# Patient Record
(demographics unavailable — no encounter records)

---

## 2024-10-30 NOTE — HISTORY OF PRESENT ILLNESS
[Right] : right hand dominant [FreeTextEntry1] : Patient presenting for a new issue of right lateral epicondylitis for the past year.  She denies any specific injury but reports feeling the lateral pain with gripping or when playing tennis.  Patient had injections to the bilateral third digits for trigger finger on September 30, 2024 with relief of symptoms.

## 2024-10-30 NOTE — ASSESSMENT
[FreeTextEntry1] : Right elbow lateral epicondylitis  My impression is that this patient has right lateral epicondylitis.   The risks, benefits, differential diagnosis and alternatives were discussed with the patient in detail and they elected to undergo a steroid injection combined with a home program and therapy. The risks discussed included (but was not limited to) pain, infection, subcutaneous atrophy, skin depigmentation, etc... Under informed consent and sterile conditions, 1 cc of 2% plain lidocaine  and 1 cc of Kenalog 10 was precisely injected into the patient's lateral elbow at the ECRB origin at the area of maximal tenderness. A sterile Band-Aid was placed.  It is my hope that this significantly alleviates the patient's symptoms.   It is hoped that this results in a complete resolution of symptoms but his symptoms were not fully resolve in the next 4-6 weeks they will return to the office for reevaluation and further treatment.

## 2024-10-30 NOTE — PHYSICAL EXAM
[de-identified] : Physical exam shows the patient to be alert and oriented x3, capable of ambulation. The patient is well-developed and well-nourished in no apparent respiratory distress. Majority of the skin is intact bilaterally in the upper extremities without lymphadenopathy at the elbows.  The elbows are supple with full range of motion without evidence of effusion or instability of the medial lateral collateral ligament complex bilaterally. There is no pain upon forced flexion or extension of the elbows bilaterally. The right lateral epicondyle is tender to palpation which is accentuated with forced . There is no pain with forced flexion or extension of the elbows bilaterally The biceps and triceps is intact bilaterally with 5 over 5 strength bilaterally. There is no sensitivity over the median, radial or ulnar nerves with provocative maneuvers at the elbows bilaterally. No evidence of intrinsic or extrinsic atrophy bilaterally.    strength 40 pounds bilaterally Right hand dominant  There is good capillary refill of the digits bilaterally.There is no masses or sensitivity over the median and ulnar nerves at the level of the wrist. There is a negative Tinel's and negative Phalen's sign bilaterally. The sensation is grossly intact bilaterally. [de-identified] : PA and lateral of the right elbow shows no evidence of soft tissue calcifications with joint spaces well-preserved.

## 2024-10-30 NOTE — PHYSICAL EXAM
[de-identified] : Physical exam shows the patient to be alert and oriented x3, capable of ambulation. The patient is well-developed and well-nourished in no apparent respiratory distress. Majority of the skin is intact bilaterally in the upper extremities without lymphadenopathy at the elbows.  The elbows are supple with full range of motion without evidence of effusion or instability of the medial lateral collateral ligament complex bilaterally. There is no pain upon forced flexion or extension of the elbows bilaterally. The right lateral epicondyle is tender to palpation which is accentuated with forced . There is no pain with forced flexion or extension of the elbows bilaterally The biceps and triceps is intact bilaterally with 5 over 5 strength bilaterally. There is no sensitivity over the median, radial or ulnar nerves with provocative maneuvers at the elbows bilaterally. No evidence of intrinsic or extrinsic atrophy bilaterally.    strength 40 pounds bilaterally Right hand dominant  There is good capillary refill of the digits bilaterally.There is no masses or sensitivity over the median and ulnar nerves at the level of the wrist. There is a negative Tinel's and negative Phalen's sign bilaterally. The sensation is grossly intact bilaterally. [de-identified] : PA and lateral of the right elbow shows no evidence of soft tissue calcifications with joint spaces well-preserved.

## 2025-06-07 NOTE — REASON FOR VISIT
EXAMINATION TYPE: MR brain wo con

 

DATE OF EXAM: 8/11/2018

 

COMPARISON: 8/9/2018 CT brain

 

HISTORY: Patient with anoxic encephalopathy and confusion

 

CONTRAST:  Performed utilizing 0 mL intravenous Gadavist gadolinium contrast.  

 

TECHNIQUE: Multiplanar, multiecho imaging on a 3.0 Qi magnet is performed through the brain.  Stud
y is not performed within 24 hours of arrival to the hospital.

 

The craniovertebral junction is normal.  The pituitary is normal.  

 

Diffusion-weighted imaging is performed.  No abnormal hyperintensity is present to suggest an acute i
ntracranial infarct or acute ischemic change.

 

No significant signal changes evident suggest ischemic changes. No suspicious focal changes within th
e basal ganglion are evident. 

 

Ventricles and sulci are appropriate for the patient age.

 

 

IMPRESSIONS:

1. No suspicious changes for acute anoxic injury. [Other: ____] : [unfilled]

## 2025-06-08 NOTE — PHYSICAL EXAM
[Well Developed] : well developed [Well Nourished] : well nourished [No Acute Distress] : no acute distress [No Carotid Bruit] : no carotid bruit [Normal S1, S2] : normal S1, S2 [No Murmur] : no murmur [Clear Lung Fields] : clear lung fields [Good Air Entry] : good air entry [Soft] : abdomen soft [No Edema] : no edema [Normal Radial B/L] : normal radial B/L [Normal PT B/L] : normal PT B/L [Normal DP B/L] : normal DP B/L [Moves all extremities] : moves all extremities [No Focal Deficits] : no focal deficits [Alert and Oriented] : alert and oriented [Normal memory] : normal memory [de-identified] : Hearing normal right, reduced on the left TMs normal [de-identified] :  Rectal exam refused  [de-identified] : Breast exam refused

## 2025-06-08 NOTE — PHYSICAL EXAM
[Well Developed] : well developed [Well Nourished] : well nourished [No Acute Distress] : no acute distress [No Carotid Bruit] : no carotid bruit [Normal S1, S2] : normal S1, S2 [No Murmur] : no murmur [Clear Lung Fields] : clear lung fields [Good Air Entry] : good air entry [Soft] : abdomen soft [No Edema] : no edema [Normal Radial B/L] : normal radial B/L [Normal PT B/L] : normal PT B/L [Normal DP B/L] : normal DP B/L [Moves all extremities] : moves all extremities [No Focal Deficits] : no focal deficits [Alert and Oriented] : alert and oriented [Normal memory] : normal memory [de-identified] : Hearing normal right, reduced on the left TMs normal [de-identified] :  Rectal exam refused  [de-identified] : Breast exam refused

## 2025-06-08 NOTE — HISTORY OF PRESENT ILLNESS
[FreeTextEntry1] : fast walk 2.5 miles. diet good. eye long ago. pap 4 mos ago, kyler for tues.  brother colon ca 63yo. covid vaccine yrs.

## 2025-06-08 NOTE — PHYSICAL EXAM
[Well Developed] : well developed [Well Nourished] : well nourished [No Acute Distress] : no acute distress [No Carotid Bruit] : no carotid bruit [Normal S1, S2] : normal S1, S2 [No Murmur] : no murmur [Clear Lung Fields] : clear lung fields [Good Air Entry] : good air entry [Soft] : abdomen soft [No Edema] : no edema [Normal Radial B/L] : normal radial B/L [Normal PT B/L] : normal PT B/L [Normal DP B/L] : normal DP B/L [Moves all extremities] : moves all extremities [No Focal Deficits] : no focal deficits [Alert and Oriented] : alert and oriented [Normal memory] : normal memory [de-identified] : Hearing normal right, reduced on the left TMs normal [de-identified] :  Rectal exam refused  [de-identified] : Breast exam refused

## 2025-06-08 NOTE — HISTORY OF PRESENT ILLNESS
[FreeTextEntry1] : fast walk 2.5 miles. diet good. eye long ago. pap 4 mos ago, kyler for tues.  brother colon ca 61yo. covid vaccine yrs.

## 2025-06-08 NOTE — DISCUSSION/SUMMARY
[EKG obtained to assist in diagnosis and management of assessed problem(s)] : EKG obtained to assist in diagnosis and management of assessed problem(s) [FreeTextEntry1] : Patient was recommended to get an eye exam and discuss colonoscopy with the gastroenterologist.  She was recommended the COVID-vaccine.  Her EKG shows normal sinus rhythm and low voltage.  She will return for an echocardiogram to evaluate her for structural heart disease.  The patient will contact me what the names of her other providers. She is not having any cognitive impairment. She has no significant current or past depression. She is fully functional and there are no safety issues. She will be screened for eye examination, colonoscopy,  Pap smear, Bone density, Mammogram and Immunizations over the next 10 years. The patient was furnished with personalized health advice and does not need referral to health education or preventative counseling services. Patient does not need advanced care planning services.  Laboratory testing was done.

## 2025-07-22 NOTE — PHYSICAL EXAM
[Supple] : supple [No Supraclavicular Adenopathy] : no supraclavicular adenopathy [Examined in the supine and seated position] : examined in the supine and seated position [No dominant masses] : no dominant masses in right breast  [No dominant masses] : no dominant masses left breast [No Nipple Retraction] : no left nipple retraction [No Nipple Discharge] : no left nipple discharge [No Axillary Lymphadenopathy] : no left axillary lymphadenopathy [Normocephalic] : normocephalic [Atraumatic] : atraumatic [EOMI] : extra ocular movement intact [PERRL] : pupils equal, round and reactive to light [Sclera nonicteric] : sclera nonicteric [Clear to Auscultation Bilat] : clear to auscultation bilaterally [Normal Sinus Rhythm] : normal sinus rhythm [Normal S1, S2] : normal S1 and S2 [Soft] : abdomen soft [Not Tender] : non-tender [No Palpable Masses] : no abdominal mass palpated [Full ROM] : full range of motion [No Rashes] : no rashes [No Ulceration] : no ulceration

## 2025-07-24 NOTE — HISTORY OF PRESENT ILLNESS
[FreeTextEntry1] : 62 year old female who presents for initial evaluation regarding RIGHT DCIS, ER+ [place]-, initially seen on screening imaging 6/9/25 as 2 groups of indeterminate calcifications in the anterior right lower inner quadrant, BI-RADS 4B, with rec for stereo bx of both groups -- completed 6/19/25; path yielded DCIS, ER+100%, [place]-, intermediate to high grade, concordant and malignant. Second group of adjacent calcifications were unable to be visualized at time of biopsy, noted that these can be included in incision as they are located within 1cm of clip.  She completed a B/L MRI (7/11/25), BI-RADS 0, with recommendation for R US for further evaluation of a 1.2cm enhancing mass in the posterior lower outer right breast. The known R DCIS was also seen on the extent of disease MRI as a NME spanning 1.9cm and notes faint enhancement in the overlying medial skin surface, which may be secondary to post biopsy change; recommending clinical correlation.  Patient denies palpable masses, nipple discharge, skin changes. Denies prior breast surgeries. Reports previous benign L breast bx in 2002.  Patient reports family hx of breast cancer in her [place]unt at age 75. Denies famhx of ovarian cancer. Patient does not have any children. Patient completed genetic testing with Dr. Newman, negative for pathogenic mutations.   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  Patient denies history of heart disease, HTN, DM, blood clots, sleep apnea, COPD, issues with anesthesia. Patient denies any known drug allergies.

## 2025-07-24 NOTE — ASSESSMENT
[FreeTextEntry1] : 62 year old female presents for initial evaluation regarding RIGHT DCIS, ER+/AR-, initially seen on screening imaging 6/9/25 as 2 groups of indeterminate calcifications in the anterior right lower inner quadrant, BI-RADS 4B, with rec for stereo bx of both groups -- completed 6/19/25; path yielded DCIS, ER+100%, [place]-, intermediate to high grade, concordant and malignant. Second group of adjacent calcifications were unable to be visualized at time of biopsy, noted that these can be included in incision as they are located within 1cm of clip.   Films reviewed in depth. Discussed patient's diagnosis in detail and answered any questions. Reviewed DCIS as stage 0 breast cancer, amenable to excision by lumpectomy to negative margins, frequently accompanied by radiation therapy. Patient made aware that treatment plan will ultimately depend upon final surgical path results. Briefly reviewed radiation treatment and side effects. Reviewed DCISionRT test, done to assess for patient's recurrence risk and guide treatment planning. Also briefly discussed anti-estrogen therapy that patient will be recommended for following surgery, given her strongly ER positive tumor. Surgical technique was reviewed in depth, post-operative recovery and care discussed, and all patient questions were answered. Patient met with nurse navigator, María, in the office today.  Discussed patient's diagnosis in detail and answered any questions. Patient will R US to further assess an additional enhancement noted on MRI. Targeted R US and possible biopsy is scheduled at [facility] on 7/23 and 7/24 respectively. Pending results, plan for R localized lumpectomy. Patient cleared in-office today; she is medically optimized for breast surgery. Surgical consent obtained today; surgical coordinator aware. All patients questions were answered to their satisfaction. Patient verbalizes understanding and agreement with the plan.

## 2025-07-24 NOTE — PLAN
[TextEntry] : -Targeted R US and possible biopsy, scheduled at [facility] on 7/23 and 7/24 respectively -PCP clearance -R localized lumpectomy

## 2025-07-24 NOTE — ASSESSMENT
[FreeTextEntry1] : 62 year old female presents for initial evaluation regarding RIGHT DCIS, ER+/AR-, initially seen on screening imaging 6/9/25 as 2 groups of indeterminate calcifications in the anterior right lower inner quadrant, BI-RADS 4B, with rec for stereo bx of both groups -- completed 6/19/25; path yielded DCIS, ER+100%, [place]-, intermediate to high grade, concordant and malignant. Second group of adjacent calcifications were unable to be visualized at time of biopsy, noted that these can be included in incision as they are located within 1cm of clip.   Films reviewed in depth. Discussed patient's diagnosis in detail and answered any questions. Reviewed DCIS as stage 0 breast cancer, amenable to excision by lumpectomy to negative margins, frequently accompanied by radiation therapy. Patient made aware that treatment plan will ultimately depend upon final surgical path results. Briefly reviewed radiation treatment and side effects. Reviewed DCISionRT test, done to assess for patient's recurrence risk and guide treatment planning. Also briefly discussed anti-estrogen therapy that patient will be recommended for following surgery, given her strongly ER positive tumor. Surgical technique was reviewed in depth, post-operative recovery and care discussed, and all patient questions were answered. Patient met with nurse navigator, María, in the office today.  Discussed patient's diagnosis in detail and answered any questions. Patient will R US to further assess an additional enhancement noted on MRI. Targeted R US and possible biopsy is scheduled at [facility] on 7/23 and 7/24 respectively. Pending results, plan for R localized lumpectomy. Patient cleared in-office today; she is medically optimized for breast surgery. Surgical consent obtained today; surgical coordinator aware. All patients questions were answered to their satisfaction. Patient verbalizes understanding and agreement with the plan.

## 2025-07-24 NOTE — DATA REVIEWED
[FreeTextEntry1] : 6/9/25 (Natasha) B/l sMMG/US: heterogeneously dense. 2 groups of indeterminate calcifications in the anterior right lower inner quadrant. BI-RADS 4B. F/u: stereo bx   6/19/25 (Natasha) Stereo biopsy x 2 sites SITE 1: R lower inner calcs 3cmfn (hourglass clip): path yielded DCIS, ER+100%, [place]-, intermediate to high grade, solid and cribriform types, a/w necrosis and calcs. Concordant and malignant. Note, second group of adjacent calcifications were unable to be visualized at time of biopsy, these can be included in incision as they are located within 1cm of clip.  SITE 2: R lower inner breast calcs unable to be visualized, procedure cancelled.  7/11/25 ([facility]) B/L MRI: heterogeneously dense. BI-RADS 0, incomplete. F/U: R US 1.  In the anterior, lower inner right breast, there is non-mass enhancement spanning 1.9 cm, corresponding to site of prior stereotactic biopsy, which yielded ductal carcinoma in situ. The area of enhancement is similar to the extent of microcalcifications seen on mammogram. There is faint enhancement in the overlying medial skin surface, which may be secondary to post biopsy change; clinical correlation is advised. There is no lateral dermal involvement or nipple invasion.   2.  In the posterior, lower outer right breast, there is a 1.2 cm enhancing mass, for which targeted ultrasound examination is recommended. 3.  No suspicious enhancement in the left breast.

## 2025-07-24 NOTE — HISTORY OF PRESENT ILLNESS
[FreeTextEntry1] : 62 year old female who presents for initial evaluation regarding RIGHT DCIS, ER+ IN-, initially seen on screening imaging 6/9/25 as 2 groups of indeterminate calcifications in the anterior right lower inner quadrant, BI-RADS 4B, with rec for stereo bx of both groups -- completed 6/19/25; path yielded DCIS, ER+100%, IN-, intermediate to high grade, concordant and malignant. Second group of adjacent calcifications were unable to be visualized at time of biopsy, noted that these can be included in incision as they are located within 1cm of clip.  She completed a B/L MRI (7/11/25), BI-RADS 0, with recommendation for R US for further evaluation of a 1.2cm enhancing mass in the posterior lower outer right breast. The known R DCIS was also seen on the extent of disease MRI as a NME spanning 1.9cm and notes faint enhancement in the overlying medial skin surface, which may be secondary to post biopsy change; recommending clinical correlation.  Patient denies palpable masses, nipple discharge, skin changes. Denies prior breast surgeries. Reports previous benign L breast bx in 2002.  Patient reports family hx of breast cancer in her [place]unt at age 75. Denies famhx of ovarian cancer. Patient does not have any children. Patient completed genetic testing with Dr. Newman, negative for pathogenic mutations.   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  Patient denies history of heart disease, HTN, DM, blood clots, sleep apnea, COPD, issues with anesthesia. Patient denies any known drug allergies.

## 2025-07-24 NOTE — DATA REVIEWED
[FreeTextEntry1] : 6/9/25 (Natasha) B/l sMMG/US: heterogeneously dense. 2 groups of indeterminate calcifications in the anterior right lower inner quadrant. BI-RADS 4B. F/u: stereo bx   6/19/25 (Natasha) Stereo biopsy x 2 sites SITE 1: R lower inner calcs 3cmfn (hourglass clip): path yielded DCIS, ER+100%, [place]-, intermediate to high grade, solid and cribriform types, a/w necrosis and calcs. Concordant and malignant. Note, second group of adjacent calcifications were unable to be visualized at time of biopsy, these can be included in incision as they are located within 1cm of clip.  SITE 2: R lower inner breast calcs unable to be visualized, procedure cancelled.  7/11/25 ([facility]) B/L MRI: heterogeneously dense. BI-RADS 0, incomplete. F/U: R US 1.  In the anterior, lower inner right breast, there is non-mass enhancement spanning 1.9 cm, corresponding to site of prior stereotactic biopsy, which yielded ductal carcinoma in situ. The area of enhancement is similar to the extent of microcalcifications seen on mammogram. There is faint enhancement in the overlying medial skin surface, which may be secondary to post biopsy change; clinical correlation is advised. There is no lateral dermal involvement or nipple invasion.   2.  In the posterior, lower outer right breast, there is a 1.2 cm enhancing mass, for which targeted ultrasound examination is recommended. 3.  No suspicious enhancement in the left breast.

## 2025-07-24 NOTE — PLAN
[TextEntry] : -Targeted R US and possible biopsy, scheduled at [facility] on 7/23 and 7/24 respectively -PCP clearance -R localized lumpectomy

## 2025-07-24 NOTE — PLAN
[TextEntry] : -Targeted R US and possible biopsy, scheduled at [facility] on 7/23 and 7/24 respectively -PCP clearance -R localized lumpectomy

## 2025-07-24 NOTE — DATA REVIEWED
[FreeTextEntry1] : 6/9/25 (Natasha) B/l sMMG/US: heterogeneously dense. 2 groups of indeterminate calcifications in the anterior right lower inner quadrant. BI-RADS 4B. F/u: stereo bx   6/19/25 (Natasha) Stereo biopsy x 2 sites SITE 1: R lower inner calcs 3cmfn (hourglass clip): path yielded DCIS, ER+100%, [place]-, intermediate to high grade, solid and cribriform types, a/w necrosis and calcs. Concordant and malignant. Note, second group of adjacent calcifications were unable to be visualized at time of biopsy, these can be included in incision as they are located within 1cm of clip.  SITE 2: R lower inner breast calcs unable to be visualized, procedure cancelled.  7/11/25 ([facility]) B/L MRI: heterogeneously dense. BI-RADS 0, incomplete. F/U: R US 1.  In the anterior, lower inner right breast, there is non-mass enhancement spanning 1.9 cm, corresponding to site of prior stereotactic biopsy, which yielded ductal carcinoma in situ. The area of enhancement is similar to the extent of microcalcifications seen on mammogram. There is faint enhancement in the overlying medial skin surface, which may be secondary to post biopsy change; clinical correlation is advised. There is no lateral dermal involvement or nipple invasion.   2.  In the posterior, lower outer right breast, there is a 1.2 cm enhancing mass, for which targeted ultrasound examination is recommended. 3.  No suspicious enhancement in the left breast.

## 2025-07-24 NOTE — ASSESSMENT
[FreeTextEntry1] : 62 year old female presents for initial evaluation regarding RIGHT DCIS, ER+/[place]-, initially seen on screening imaging 6/9/25 as 2 groups of indeterminate calcifications in the anterior right lower inner quadrant, BI-RADS 4B, with rec for stereo bx of both groups -- completed 6/19/25; path yielded DCIS, ER+100%, [place]-, intermediate to high grade, concordant and malignant. Second group of adjacent calcifications were unable to be visualized at time of biopsy, noted that these can be included in incision as they are located within 1cm of clip.   Films reviewed in depth. Discussed patient's diagnosis in detail and answered any questions. Reviewed DCIS as stage 0 breast cancer, amenable to excision by lumpectomy to negative margins, frequently accompanied by radiation therapy. Patient made aware that treatment plan will ultimately depend upon final surgical path results. Briefly reviewed radiation treatment and side effects. Reviewed DCISionRT test, done to assess for patient's recurrence risk and guide treatment planning. Also briefly discussed anti-estrogen therapy that patient will be recommended for following surgery, given her strongly ER positive tumor. Surgical technique was reviewed in depth, post-operative recovery and care discussed, and all patient questions were answered. Patient met with nurse navigator, María, in the office today.  Discussed patient's diagnosis in detail and answered any questions. Patient will R US to further assess an additional enhancement noted on MRI. Targeted R US and possible biopsy is scheduled at [facility] on 7/23 and 7/24 respectively. Pending results, plan for R localized lumpectomy. Patient cleared in-office today; she is medically optimized for breast surgery. Surgical consent obtained today; surgical coordinator aware. All patients questions were answered to their satisfaction. Patient verbalizes understanding and agreement with the plan.

## 2025-07-24 NOTE — CONSULT LETTER
[Dear  ___] : Dear ~BROOKLYNN, [Consult Letter:] : I had the pleasure of evaluating your patient, [unfilled]. [Please see my note below.] : Please see my note below. [Consult Closing:] : Thank you very much for allowing me to participate in the care of this patient.  If you have any questions, please do not hesitate to contact me. [Sincerely,] : Sincerely, [FreeTextEntry2] : Dr. Kristal Newman [FreeTextEntry3] : Silverio Chong MD Chief of Breast Surgery Director of Breast Cancer Program [facility]

## 2025-07-24 NOTE — CONSULT LETTER
[Dear  ___] : Dear ~BROOKLYNN, [Consult Letter:] : I had the pleasure of evaluating your patient, [unfilled]. [Please see my note below.] : Please see my note below. [Consult Closing:] : Thank you very much for allowing me to participate in the care of this patient.  If you have any questions, please do not hesitate to contact me. [Sincerely,] : Sincerely, [FreeTextEntry2] : Dr. Kristal Newman [FreeTextEntry3] : Silverio Chong MD Chief of Breast Surgery Director of Breast Cancer Program [facility]

## 2025-07-24 NOTE — HISTORY OF PRESENT ILLNESS
[FreeTextEntry1] : 62 year old female who presents for initial evaluation regarding RIGHT DCIS, ER+ [place]-, initially seen on screening imaging 6/9/25 as 2 groups of indeterminate calcifications in the anterior right lower inner quadrant, BI-RADS 4B, with rec for stereo bx of both groups -- completed 6/19/25; path yielded DCIS, ER+100%, [place]-, intermediate to high grade, concordant and malignant. Second group of adjacent calcifications were unable to be visualized at time of biopsy, noted that these can be included in incision as they are located within 1cm of clip.  She completed a B/L MRI (7/11/25), BI-RADS 0, with recommendation for R US for further evaluation of a 1.2cm enhancing mass in the posterior lower outer right breast. The known R DCIS was also seen on the extent of disease MRI as a NME spanning 1.9cm and notes faint enhancement in the overlying medial skin surface, which may be secondary to post biopsy change; recommending clinical correlation.  Patient denies palpable masses, nipple discharge, skin changes. Denies prior breast surgeries. Reports previous benign L breast bx in 2002.  Patient reports family hx of breast cancer in her PAunt at age 75. Denies famhx of ovarian cancer. Patient does not have any children. Patient completed genetic testing with Dr. Newman, negative for pathogenic mutations.   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  Patient denies history of heart disease, HTN, DM, blood clots, sleep apnea, COPD, issues with anesthesia. Patient denies any known drug allergies.

## 2025-07-24 NOTE — DATA REVIEWED
[FreeTextEntry1] : 6/9/25 ([facility]) B/l sMMG/US: heterogeneously dense. 2 groups of indeterminate calcifications in the anterior right lower inner quadrant. BI-RADS 4B. F/u: stereo bx   6/19/25 (Natasha) Stereo biopsy x 2 sites SITE 1: R lower inner calcs 3cmfn (hourglass clip): path yielded DCIS, ER+100%, MA-, intermediate to high grade, solid and cribriform types, a/w necrosis and calcs. Concordant and malignant. Note, second group of adjacent calcifications were unable to be visualized at time of biopsy, these can be included in incision as they are located within 1cm of clip.  SITE 2: R lower inner breast calcs unable to be visualized, procedure cancelled.  7/11/25 ([facility]) B/L MRI: heterogeneously dense. BI-RADS 0, incomplete. F/U: R US 1.  In the anterior, lower inner right breast, there is non-mass enhancement spanning 1.9 cm, corresponding to site of prior stereotactic biopsy, which yielded ductal carcinoma in situ. The area of enhancement is similar to the extent of microcalcifications seen on mammogram. There is faint enhancement in the overlying medial skin surface, which may be secondary to post biopsy change; clinical correlation is advised. There is no lateral dermal involvement or nipple invasion.   2.  In the posterior, lower outer right breast, there is a 1.2 cm enhancing mass, for which targeted ultrasound examination is recommended. 3.  No suspicious enhancement in the left breast.

## 2025-07-24 NOTE — HISTORY OF PRESENT ILLNESS
[FreeTextEntry1] : 62 year old female who presents for initial evaluation regarding RIGHT DCIS, ER+ [place]-, initially seen on screening imaging 6/9/25 as 2 groups of indeterminate calcifications in the anterior right lower inner quadrant, BI-RADS 4B, with rec for stereo bx of both groups -- completed 6/19/25; path yielded DCIS, ER+100%, [place]-, intermediate to high grade, concordant and malignant. Second group of adjacent calcifications were unable to be visualized at time of biopsy, noted that these can be included in incision as they are located within 1cm of clip.  She completed a B/L MRI (7/11/25), BI-RADS 0, with recommendation for R US for further evaluation of a 1.2cm enhancing mass in the posterior lower outer right breast. The known R DCIS was also seen on the extent of disease MRI as a NME spanning 1.9cm and notes faint enhancement in the overlying medial skin surface, which may be secondary to post biopsy change; recommending clinical correlation.  Patient denies palpable masses, nipple discharge, skin changes. Denies prior breast surgeries. Reports previous benign L breast bx in 2002.  Patient reports family hx of breast cancer in her [place]unt at age 75. Denies famhx of ovarian cancer. Patient does not have any children. Patient completed genetic testing with Dr. Newman, negative for pathogenic mutations.   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  Patient denies history of heart disease, HTN, DM, blood clots, sleep apnea, COPD, issues with anesthesia. Patient denies any known drug allergies.

## 2025-07-24 NOTE — CONSULT LETTER
[Dear  ___] : Dear ~BROOKLYNN, [Consult Letter:] : I had the pleasure of evaluating your patient, [unfilled]. [Please see my note below.] : Please see my note below. [Consult Closing:] : Thank you very much for allowing me to participate in the care of this patient.  If you have any questions, please do not hesitate to contact me. [Sincerely,] : Sincerely, [FreeTextEntry2] : Dr. Kristal Newman [FreeTextEntry3] : Silverio Chong MD Chief of Breast Surgery Director of Breast Cancer Program [facility]

## 2025-07-24 NOTE — PAST MEDICAL HISTORY
[Postmenopausal] : The patient is postmenopausal [Menarche Age ____] : age at menarche was [unfilled] [Menopause Age____] : age at menopause was [unfilled] [Approximately ___] : the LMP was approximately [unfilled] [Regular Cycle Intervals] : have been regular [Total Preg ___] : G[unfilled] [History of Hormone Replacement Treatment] : has no history of hormone replacement treatment [FreeTextEntry6] : no [FreeTextEntry7] : former - 6 years [FreeTextEntry8] : n/a

## 2025-07-24 NOTE — PLAN
[TextEntry] : -Targeted R US and possible biopsy, scheduled at [facility] on 7/23 and 7/24 respectively -PCP clearance -R localized lumpectomy

## 2025-07-24 NOTE — CONSULT LETTER
[Dear  ___] : Dear ~BROOKLYNN, [Consult Letter:] : I had the pleasure of evaluating your patient, [unfilled]. [Please see my note below.] : Please see my note below. [Consult Closing:] : Thank you very much for allowing me to participate in the care of this patient.  If you have any questions, please do not hesitate to contact me. [Sincerely,] : Sincerely, [FreeTextEntry2] : Dr. Kristal Newman [FreeTextEntry3] : Silverio Chong MD Chief of Breast Surgery Director of Breast Cancer Program [facility]

## 2025-07-24 NOTE — ASSESSMENT
[FreeTextEntry1] : 62 year old female presents for initial evaluation regarding RIGHT DCIS, ER+/DE-, initially seen on screening imaging 6/9/25 as 2 groups of indeterminate calcifications in the anterior right lower inner quadrant, BI-RADS 4B, with rec for stereo bx of both groups -- completed 6/19/25; path yielded DCIS, ER+100%, [place]-, intermediate to high grade, concordant and malignant. Second group of adjacent calcifications were unable to be visualized at time of biopsy, noted that these can be included in incision as they are located within 1cm of clip.   Films reviewed in depth. Discussed patient's diagnosis in detail and answered any questions. Reviewed DCIS as stage 0 breast cancer, amenable to excision by lumpectomy to negative margins, frequently accompanied by radiation therapy. Patient made aware that treatment plan will ultimately depend upon final surgical path results. Briefly reviewed radiation treatment and side effects. Reviewed DCISionRT test, done to assess for patient's recurrence risk and guide treatment planning. Also briefly discussed anti-estrogen therapy that patient will be recommended for following surgery, given her strongly ER positive tumor. Surgical technique was reviewed in depth, post-operative recovery and care discussed, and all patient questions were answered. Patient met with nurse navigator, María, in the office today.  Discussed patient's diagnosis in detail and answered any questions. Patient will R US to further assess an additional enhancement noted on MRI. Targeted R US and possible biopsy is scheduled at [facility] on 7/23 and 7/24 respectively. Pending results, plan for R localized lumpectomy. Patient cleared in-office today; she is medically optimized for breast surgery. Surgical consent obtained today; surgical coordinator aware. All patients questions were answered to their satisfaction. Patient verbalizes understanding and agreement with the plan.